# Patient Record
Sex: FEMALE | Race: WHITE | NOT HISPANIC OR LATINO | Employment: OTHER | URBAN - METROPOLITAN AREA
[De-identification: names, ages, dates, MRNs, and addresses within clinical notes are randomized per-mention and may not be internally consistent; named-entity substitution may affect disease eponyms.]

---

## 2017-11-10 PROBLEM — M81.0 AGE RELATED OSTEOPOROSIS: Status: ACTIVE | Noted: 2017-11-10

## 2018-08-27 PROBLEM — E74.39 GLUCOSE INTOLERANCE: Status: ACTIVE | Noted: 2018-08-27

## 2018-08-28 PROBLEM — E74.39 GLUCOSE INTOLERANCE: Status: RESOLVED | Noted: 2018-08-27 | Resolved: 2018-08-28

## 2018-08-28 PROBLEM — R73.03 PREDIABETES: Status: ACTIVE | Noted: 2018-08-28

## 2019-03-27 PROBLEM — J45.40 MODERATE PERSISTENT ASTHMA WITHOUT COMPLICATION: Status: ACTIVE | Noted: 2019-03-27

## 2020-03-25 ENCOUNTER — TELEPHONE (OUTPATIENT)
Dept: HEALTH INFORMATION MANAGEMENT | Facility: OTHER | Age: 65
End: 2020-03-25

## 2020-03-25 NOTE — TELEPHONE ENCOUNTER
1. Caller Name:Marilyn Hernandez                      Call Back Number: [phone]  [facility] PCP or Specialty Provider: Giselle Marroquin P.A.-C.        2.  Does patient have any active symptoms of respiratory illness (fever OR cough OR shortness of breath OR sore throat)? Yes, the patient reports the following respiratory symptoms: sore throat. Stuffy nose this am.     3.  Does patient have any comoribidities? None mild asthma takes symbacort    4.  Has the patient traveled in the last 14 days OR had any known contact with someone who is suspected or confirmed to have COVID-19?  No.    5. Disposition: Advised to perform self care, monitor for worsening symptoms and to call back in 3 days if no improvement.     Note routed to [facility] Provider: FYI only.

## 2020-05-22 ENCOUNTER — APPOINTMENT (OUTPATIENT)
Dept: NEUROLOGY | Facility: MEDICAL CENTER | Age: 65
End: 2020-05-22
Payer: COMMERCIAL

## 2020-09-02 PROBLEM — H81.03 MENIERE'S DISEASE OF BOTH EARS: Status: ACTIVE | Noted: 2020-09-02

## 2020-09-29 PROBLEM — R73.03 PREDIABETES: Status: RESOLVED | Noted: 2018-08-28 | Resolved: 2020-09-29

## 2021-08-02 ENCOUNTER — PATIENT OUTREACH (OUTPATIENT)
Dept: HEALTH INFORMATION MANAGEMENT | Facility: OTHER | Age: 66
End: 2021-08-02

## 2022-08-17 PROBLEM — J45.40 MODERATE PERSISTENT ASTHMA WITHOUT COMPLICATION: Status: RESOLVED | Noted: 2019-03-27 | Resolved: 2022-08-17
